# Patient Record
Sex: FEMALE | Race: WHITE | NOT HISPANIC OR LATINO | URBAN - METROPOLITAN AREA
[De-identification: names, ages, dates, MRNs, and addresses within clinical notes are randomized per-mention and may not be internally consistent; named-entity substitution may affect disease eponyms.]

---

## 2017-08-29 ENCOUNTER — OUTPATIENT (OUTPATIENT)
Dept: OUTPATIENT SERVICES | Facility: HOSPITAL | Age: 67
LOS: 1 days | Discharge: HOME | End: 2017-08-29

## 2017-08-29 ENCOUNTER — RESULT REVIEW (OUTPATIENT)
Age: 67
End: 2017-08-29

## 2017-08-31 LAB — SURGICAL PATHOLOGY STUDY: SIGNIFICANT CHANGE UP

## 2017-09-01 DIAGNOSIS — H04.562 STENOSIS OF LEFT LACRIMAL PUNCTUM: ICD-10-CM

## 2021-07-13 PROBLEM — Z00.00 ENCOUNTER FOR PREVENTIVE HEALTH EXAMINATION: Status: ACTIVE | Noted: 2021-07-13

## 2021-07-14 ENCOUNTER — TRANSCRIPTION ENCOUNTER (OUTPATIENT)
Age: 71
End: 2021-07-14

## 2021-07-14 ENCOUNTER — APPOINTMENT (OUTPATIENT)
Dept: ORTHOPEDIC SURGERY | Facility: CLINIC | Age: 71
End: 2021-07-14
Payer: MEDICARE

## 2021-07-14 VITALS — RESPIRATION RATE: 16 BRPM | BODY MASS INDEX: 22.5 KG/M2 | WEIGHT: 127 LBS | HEIGHT: 63 IN

## 2021-07-14 DIAGNOSIS — Z78.9 OTHER SPECIFIED HEALTH STATUS: ICD-10-CM

## 2021-07-14 PROCEDURE — 73130 X-RAY EXAM OF HAND: CPT | Mod: RT

## 2021-07-14 PROCEDURE — 99203 OFFICE O/P NEW LOW 30 MIN: CPT

## 2021-07-14 NOTE — ASSESSMENT
[FreeTextEntry1] : Diffuse arthritis of the hands bilaterally without significant symptoms.\par \par 8 days status post right palmar laceration with injury to the common digital nerve extending to the third and fourth digits.\par \par The risks, benefits, alternatives and associated differential diagnosis was discussed with the patient. Options ranged from conservative care to surgical intervention were reviewed and all questions answered. Patient appeared to have an excellent understanding of the risks as well as differential diagnosis associated with this condition.\par \par Patient would like to proceed with exploration and microscopic repair of the right third and fourth digits. Possible allograft.

## 2021-07-14 NOTE — PHYSICAL EXAM
[de-identified] : Physical exam shows the patient to be alert and oriented x3, capable of ambulation. The patient is well-developed and well-nourished in no apparent respiratory distress. Majority of the skin is intact bilaterally in the upper extremities without lymphadenopathy at the elbows.\par There is no evidence of infection in the hand with the suture line intact.\par There is a 1.5 cm laceration in the mid palm of the right hand with FDS, FDP and extensor mechanism intact, but limited range of motion secondary to swelling and discomfort, but no evidence of crepitus.\par Good capillary refill of all the digits\par Sensation is 5 mm in the hand except for the right radial third and ulnar fourth digits with a positive Tinel's at the laceration point. The sensation is greater than 15 mm in this region. 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

## 2021-07-14 NOTE — CONSULT LETTER
[Dear  ___] : Dear  [unfilled], [Consult Letter:] : I had the pleasure of evaluating your patient, [unfilled]. [Please see my note below.] : Please see my note below. [Consult Closing:] : Thank you very much for allowing me to participate in the care of this patient.  If you have any questions, please do not hesitate to contact me. [Sincerely,] : Sincerely, [FreeTextEntry3] : Logan Ambrose M.D., FAAOS\par Co-Director\par [facility]\par \par

## 2021-07-14 NOTE — HISTORY OF PRESENT ILLNESS
[Left] : left hand dominant [FreeTextEntry1] : DOI-7/6/21\par 8 days s/p  right volar hand puncture wound sustained while trying to cut an avocado with a serrated knife. She was treated at [facility] where she received sutures, the hand was wrapped in cling and she was prescribed cephalaxin 500 mg 4 times a day which she completed

## 2021-07-15 ENCOUNTER — OUTPATIENT (OUTPATIENT)
Dept: OUTPATIENT SERVICES | Facility: HOSPITAL | Age: 71
LOS: 1 days | Discharge: HOME | End: 2021-07-15

## 2021-07-15 ENCOUNTER — APPOINTMENT (OUTPATIENT)
Dept: ORTHOPEDIC SURGERY | Facility: AMBULATORY SURGERY CENTER | Age: 71
End: 2021-07-15
Payer: MEDICARE

## 2021-07-15 PROCEDURE — 69990 MICROSURGERY ADD-ON: CPT | Mod: RT

## 2021-07-15 PROCEDURE — 64831 REPAIR OF DIGIT NERVE: CPT | Mod: RT

## 2021-07-16 ENCOUNTER — TRANSCRIPTION ENCOUNTER (OUTPATIENT)
Age: 71
End: 2021-07-16

## 2021-07-26 ENCOUNTER — APPOINTMENT (OUTPATIENT)
Dept: ORTHOPEDIC SURGERY | Facility: CLINIC | Age: 71
End: 2021-07-26
Payer: MEDICARE

## 2021-07-26 PROCEDURE — 99024 POSTOP FOLLOW-UP VISIT: CPT

## 2021-07-26 RX ORDER — ATORVASTATIN CALCIUM 10 MG/1
10 TABLET, FILM COATED ORAL
Qty: 90 | Refills: 0 | Status: ACTIVE | COMMUNITY
Start: 2021-02-26

## 2021-07-26 RX ORDER — THYROID, PORCINE 30 MG/1
30 TABLET ORAL
Qty: 90 | Refills: 0 | Status: ACTIVE | COMMUNITY
Start: 2021-06-18

## 2021-07-26 RX ORDER — CIPROFLOXACIN HYDROCHLORIDE 500 MG/1
500 TABLET, FILM COATED ORAL
Qty: 14 | Refills: 0 | Status: DISCONTINUED | COMMUNITY
Start: 2021-07-14 | End: 2021-07-26

## 2021-07-26 RX ORDER — OXYCODONE AND ACETAMINOPHEN 5; 325 MG/1; MG/1
5-325 TABLET ORAL
Qty: 15 | Refills: 0 | Status: DISCONTINUED | COMMUNITY
Start: 2021-07-14 | End: 2021-07-26

## 2021-07-26 RX ORDER — CEPHALEXIN 500 MG/1
500 CAPSULE ORAL
Qty: 20 | Refills: 0 | Status: ACTIVE | COMMUNITY
Start: 2021-07-06

## 2021-07-26 RX ORDER — ATORVASTATIN CALCIUM 20 MG/1
20 TABLET, FILM COATED ORAL
Qty: 90 | Refills: 0 | Status: ACTIVE | COMMUNITY
Start: 2021-07-18

## 2021-08-05 ENCOUNTER — APPOINTMENT (OUTPATIENT)
Dept: ORTHOPEDIC SURGERY | Facility: CLINIC | Age: 71
End: 2021-08-05
Payer: MEDICARE

## 2021-08-05 PROCEDURE — 99024 POSTOP FOLLOW-UP VISIT: CPT

## 2021-10-01 ENCOUNTER — APPOINTMENT (OUTPATIENT)
Dept: ORTHOPEDIC SURGERY | Facility: CLINIC | Age: 71
End: 2021-10-01
Payer: MEDICARE

## 2021-10-01 DIAGNOSIS — S61.431A PUNCTURE WOUND W/OUT FOREIGN BODY OF RIGHT HAND, INITIAL ENCOUNTER: ICD-10-CM

## 2021-10-01 PROCEDURE — 99024 POSTOP FOLLOW-UP VISIT: CPT

## 2021-11-29 ENCOUNTER — APPOINTMENT (OUTPATIENT)
Dept: ORTHOPEDIC SURGERY | Facility: CLINIC | Age: 71
End: 2021-11-29
Payer: MEDICARE

## 2021-11-29 VITALS — HEIGHT: 63 IN | BODY MASS INDEX: 22.5 KG/M2 | RESPIRATION RATE: 16 BRPM | WEIGHT: 127 LBS

## 2021-11-29 PROCEDURE — 99213 OFFICE O/P EST LOW 20 MIN: CPT

## 2022-05-16 ENCOUNTER — APPOINTMENT (OUTPATIENT)
Dept: ORTHOPEDIC SURGERY | Facility: CLINIC | Age: 72
End: 2022-05-16
Payer: MEDICARE

## 2022-05-16 VITALS — RESPIRATION RATE: 16 BRPM | WEIGHT: 127 LBS | BODY MASS INDEX: 22.5 KG/M2 | HEIGHT: 63 IN

## 2022-05-16 DIAGNOSIS — S64.40XA INJURY OF DIGITAL NERVE OF UNSPECIFIED FINGER, INITIAL ENCOUNTER: ICD-10-CM

## 2022-05-16 DIAGNOSIS — M65.351 TRIGGER FINGER, RIGHT LITTLE FINGER: ICD-10-CM

## 2022-05-16 PROCEDURE — 99213 OFFICE O/P EST LOW 20 MIN: CPT

## 2023-05-01 ENCOUNTER — APPOINTMENT (OUTPATIENT)
Dept: ORTHOPEDIC SURGERY | Facility: CLINIC | Age: 73
End: 2023-05-01
Payer: COMMERCIAL

## 2023-05-01 VITALS — BODY MASS INDEX: 23.92 KG/M2 | HEIGHT: 63 IN | WEIGHT: 135 LBS

## 2023-05-01 DIAGNOSIS — M75.52 BURSITIS OF LEFT SHOULDER: ICD-10-CM

## 2023-05-01 PROCEDURE — 73060 X-RAY EXAM OF HUMERUS: CPT

## 2023-05-01 PROCEDURE — 99203 OFFICE O/P NEW LOW 30 MIN: CPT

## 2023-05-04 PROBLEM — M75.52 BURSITIS OF LEFT SHOULDER: Status: ACTIVE | Noted: 2023-05-04

## 2023-05-04 NOTE — PHYSICAL EXAM
[de-identified] : Left Shoulder:\par Constitutional:\par The patient is healthy-appearing and in no apparent distress. \par \par Cardiovascular System: \par The capillary refill is less than 2 seconds. \par \par Skin: \par There are no skin abnormalities.\par \par C-Spine/Neck:\par \par Active Range of Motion:\par Flexion				50\par Extension			60\par Lateral rotation			80  \par \par Left Shoulder: \par Inspection: \par There is no atrophy, erythema, warmth, swelling.\par There is no scapular winging.\par There is no AC prominence. \par \par Bony Palpation: \par There is no tenderness of the clavicle.\par There is no tenderness of the acromioclavicular joint.\par There is no tenderness of the greater tuberosity. \par There is no tenderness of the bicipital groove.\par  \par Soft Tissue Palpation: \par There is no tenderness of the trapezius.\par There is no tenderness of the rhomboid.\par There is no tenderness of the subacromial bursa. \par \par Active Range of Motion: \par Forward flexion- 				180 \par Abduction-					150\par External rotation at 0 degrees abduction-	80 \par Internal rotation at 0 degrees abduction-	80\par \par Passive Range of Motion: \par Forward flexion- 			180 \par Abduction-				150\par External rotation at 0 deg abduction-	80 \par Internal rotation at 0 deg abduction-	80\par \par Strength:\par Supraspinatus / Abduction                  5/5\par External rotation                                 5/5\par Internal roation                                   5/5\par \par Special Tests: \par Hawkin's  				Positive \par Neer's  				Positive \par Speed's  				Negative\par AC cross-over 			            Negative\par Paulding's  				Negative\par \par Neurological System: \par \par There is normal sensation to light touch C5-T1. \par \par Stability: \par There is no general laxity. \par \par Psychiatric: \par The patient demonstrates a normal mood and affect and is active and alert [de-identified] : X-ray left humerus.  There is no significant bony / soft tissue abnormality, arthritis, or fracture.

## 2023-05-04 NOTE — ASSESSMENT
[FreeTextEntry1] : Discussed at length with patient exam history and imaging as well as treatment options and at this time patient elects home exercises physical therapy.  Offered cortisone injection but declines.  If no improvement consideration to injection and ultimately MRI

## 2023-05-04 NOTE — HISTORY OF PRESENT ILLNESS
[de-identified] : location: left humerus\par duration: 2 months\par context: intermittent; atraumatic\par quality: sharp, pulling, lingering \par aggravating factors: arm abduction, arm behind back\par associated sx: N/A\par conservative tx: N/A\par prior studies: N/A

## 2023-06-07 ENCOUNTER — OFFICE (OUTPATIENT)
Dept: URBAN - METROPOLITAN AREA CLINIC 38 | Facility: CLINIC | Age: 73
Setting detail: OPHTHALMOLOGY
End: 2023-06-07
Payer: COMMERCIAL

## 2023-06-07 DIAGNOSIS — H01.004: ICD-10-CM

## 2023-06-07 DIAGNOSIS — H01.001: ICD-10-CM

## 2023-06-07 DIAGNOSIS — H50.05: ICD-10-CM

## 2023-06-07 DIAGNOSIS — H25.13: ICD-10-CM

## 2023-06-07 PROCEDURE — 92004 COMPRE OPH EXAM NEW PT 1/>: CPT

## 2023-06-07 ASSESSMENT — CONFRONTATIONAL VISUAL FIELD TEST (CVF)
OS_FINDINGS: FULL
OD_FINDINGS: FULL

## 2023-06-07 ASSESSMENT — LID EXAM ASSESSMENTS
OS_BLEPHARITIS: LUL 2+
OD_BLEPHARITIS: RUL 1+ 2+

## 2023-06-07 ASSESSMENT — TONOMETRY
OS_IOP_MMHG: 11
OD_IOP_MMHG: 11

## 2023-06-08 ASSESSMENT — VISUAL ACUITY
OD_BCVA: 20/40+2
OS_BCVA: 20/25-

## 2023-06-09 ENCOUNTER — OFFICE (OUTPATIENT)
Dept: URBAN - METROPOLITAN AREA CLINIC 38 | Facility: CLINIC | Age: 73
Setting detail: OPHTHALMOLOGY
End: 2023-06-09
Payer: COMMERCIAL

## 2023-06-09 DIAGNOSIS — H50.05: ICD-10-CM

## 2023-06-09 DIAGNOSIS — H49.22: ICD-10-CM

## 2023-06-09 PROBLEM — H01.004 BLEPHARITIS; RIGHT UPPER LID, LEFT UPPER LID: Status: ACTIVE | Noted: 2023-06-07

## 2023-06-09 PROBLEM — H25.13 CATARACT SENILE NUCLEAR SCLEROSIS; BOTH EYES: Status: ACTIVE | Noted: 2023-06-07

## 2023-06-09 PROBLEM — H01.001 BLEPHARITIS; RIGHT UPPER LID, LEFT UPPER LID: Status: ACTIVE | Noted: 2023-06-07

## 2023-06-09 PROCEDURE — 92060 SENSORIMOTOR EXAMINATION: CPT | Performed by: OPHTHALMOLOGY

## 2023-06-09 PROCEDURE — 99213 OFFICE O/P EST LOW 20 MIN: CPT | Performed by: OPHTHALMOLOGY

## 2023-06-09 ASSESSMENT — VISUAL ACUITY
OD_BCVA: 20/40+3
OS_BCVA: 20/25-2

## 2023-06-09 ASSESSMENT — CONFRONTATIONAL VISUAL FIELD TEST (CVF)
OS_FINDINGS: FULL
OD_FINDINGS: FULL

## 2023-06-09 ASSESSMENT — LID EXAM ASSESSMENTS
OD_BLEPHARITIS: RUL 1+ 2+
OS_BLEPHARITIS: LUL 2+

## 2023-11-14 ENCOUNTER — APPOINTMENT (OUTPATIENT)
Dept: ULTRASOUND IMAGING | Facility: CLINIC | Age: 73
End: 2023-11-14
Payer: COMMERCIAL

## 2023-11-14 ENCOUNTER — APPOINTMENT (OUTPATIENT)
Dept: MAMMOGRAPHY | Facility: CLINIC | Age: 73
End: 2023-11-14
Payer: COMMERCIAL

## 2023-11-14 PROCEDURE — 77067 SCR MAMMO BI INCL CAD: CPT

## 2023-11-14 PROCEDURE — 77063 BREAST TOMOSYNTHESIS BI: CPT

## 2023-11-14 PROCEDURE — 76641 ULTRASOUND BREAST COMPLETE: CPT | Mod: 50

## 2024-01-03 ENCOUNTER — RX ONLY (RX ONLY)
Age: 74
End: 2024-01-03

## 2024-01-03 ENCOUNTER — OFFICE (OUTPATIENT)
Dept: URBAN - METROPOLITAN AREA CLINIC 8 | Facility: CLINIC | Age: 74
Setting detail: OPHTHALMOLOGY
End: 2024-01-03
Payer: COMMERCIAL

## 2024-01-03 DIAGNOSIS — H10.45: ICD-10-CM

## 2024-01-03 PROCEDURE — 99213 OFFICE O/P EST LOW 20 MIN: CPT | Performed by: OPHTHALMOLOGY

## 2024-01-03 ASSESSMENT — LID EXAM ASSESSMENTS
OS_BLEPHARITIS: LUL 2+
OD_BLEPHARITIS: RUL 1+ 2+
OD_EDEMA: RLL RUL T 1+
OS_EDEMA: LLL LUL T 1+

## 2024-01-03 ASSESSMENT — CONFRONTATIONAL VISUAL FIELD TEST (CVF)
OS_FINDINGS: FULL
OD_FINDINGS: FULL

## 2024-08-07 ENCOUNTER — RX ONLY (RX ONLY)
Age: 74
End: 2024-08-07

## 2024-08-07 ENCOUNTER — OFFICE (OUTPATIENT)
Dept: URBAN - METROPOLITAN AREA CLINIC 8 | Facility: CLINIC | Age: 74
Setting detail: OPHTHALMOLOGY
End: 2024-08-07
Payer: COMMERCIAL

## 2024-08-07 DIAGNOSIS — H01.001: ICD-10-CM

## 2024-08-07 DIAGNOSIS — H16.223: ICD-10-CM

## 2024-08-07 DIAGNOSIS — H25.13: ICD-10-CM

## 2024-08-07 DIAGNOSIS — H01.004: ICD-10-CM

## 2024-08-07 DIAGNOSIS — H52.4: ICD-10-CM

## 2024-08-07 PROCEDURE — 92015 DETERMINE REFRACTIVE STATE: CPT | Performed by: OPHTHALMOLOGY

## 2024-08-07 PROCEDURE — 92014 COMPRE OPH EXAM EST PT 1/>: CPT | Performed by: OPHTHALMOLOGY

## 2024-08-07 ASSESSMENT — LID EXAM ASSESSMENTS
OD_EDEMA: RLL RUL T 1+
OD_BLEPHARITIS: RUL 1+ 2+
OS_BLEPHARITIS: LUL 2+
OS_EDEMA: LLL LUL T 1+

## 2024-08-07 ASSESSMENT — CONFRONTATIONAL VISUAL FIELD TEST (CVF)
OS_FINDINGS: FULL
OD_FINDINGS: FULL

## 2025-02-11 ENCOUNTER — APPOINTMENT (OUTPATIENT)
Dept: MAMMOGRAPHY | Facility: CLINIC | Age: 75
End: 2025-02-11
Payer: MEDICARE

## 2025-02-11 ENCOUNTER — APPOINTMENT (OUTPATIENT)
Dept: ULTRASOUND IMAGING | Facility: CLINIC | Age: 75
End: 2025-02-11
Payer: MEDICARE

## 2025-02-11 PROCEDURE — 77063 BREAST TOMOSYNTHESIS BI: CPT

## 2025-02-11 PROCEDURE — 77067 SCR MAMMO BI INCL CAD: CPT

## 2025-02-11 PROCEDURE — 76641 ULTRASOUND BREAST COMPLETE: CPT | Mod: 50,GZ
